# Patient Record
(demographics unavailable — no encounter records)

---

## 2024-11-12 NOTE — HEALTH RISK ASSESSMENT
[Good] : ~his/her~  mood as  good [Yes] : Yes [No] : In the past 12 months have you used drugs other than those required for medical reasons? No [0] : 2) Feeling down, depressed, or hopeless: Not at all (0) [Never] : Never [NO] : No [FreeTextEntry1] : health maintenance  [PHQ-2 Negative - No further assessment needed] : PHQ-2 Negative - No further assessment needed [de-identified] : ocasionally [[id number]] : 0 [BoneDensityComments] : n/a [ColonoscopyComments] : n/a

## 2024-11-12 NOTE — ASSESSMENT
[FreeTextEntry1] :  Annual Physical Exam: Bilateral Hand pain, Hearing loss, Nasal congestion  - BP is slightly elevated. Continue current management. - Check A1c, lipid panels, vitamin levels, urine analysis, TSH, PSA Total - ENT Referral, ORTHO Hand Surgery Referral suspect plantar fascitis, advised insole and exercises - Order Flu Panel, US Abdomen Complete   - RTO annually or as needed.   Pt verbalized understanding and will reach should any questions/concerns occur.

## 2024-11-12 NOTE — HISTORY OF PRESENT ILLNESS
[FreeTextEntry1] : Patient is present today to establish care and for a comprehensive Annual Physical Exam. [de-identified] : Patient betsey 52yr old male who is present today to establish care and for a comprehensive Annual Physical Exam.  Patient c/o congestion since yesterday, reports co-worker has had same issue for a few weeks now.  Reports body aches. Pt reports being hungry and having appetite. Reports breathing normal, woke up with slight CP this morning. Pt reports hands constantly require cracking and also reports pain at the bottom of left heel, hurts in the morning. Reports pain started ever since jogging without shoes once. Reports receiving a shot for trigger finger. Reports pain in fingers, feels release after cracking, discussed arthritis and also discussed hand specialist referral.   Pt reports that he stopped smoking weed for two months now. Reports neck pain/migraines some months ago, felt some relief after he stopped smoking and after massaging neck. Confirms exercising routinely. Pt reports really bad hangovers after drinking hard liquor, confirms drinking lots of water. Confirms no one in his family is also sick right now. Denies getting Shingles vaccine. Reports intermittent back pain is intermittent, reports nerve block injections made it slightly worse, but pack pain is generally better than before. Unaware if grinding teeth. Discussed routine annual ultrasound prescription for fatty liver. Discussed ENT referral. Reports trying to eat as healthy as possible. Reports stretching regularly. Discussed Plantar Fasciitis, discussed Aleve for a week, rolling heel over frozen water bottle (10 minutes) or cortisone shots.  Swab, oral consent received, normal.  Denies any CP, chest tightness or SOB. Denies any abdominal pain, urinary symptom, or change in bowel habits. Denies any fever, chills, or night sweats.

## 2024-11-12 NOTE — ADDENDUM
[FreeTextEntry1] : I, Carlo Parikh, acted as a scribe on behalf of Dr. Antoine Pisano MD, on 11/12/2024.   All medical entries made by the scribe were at my, Dr. Antoine Pisano MD, direction and personally dictated by me on 11/12/2024. I have reviewed the chart and agree that the record accurately reflects my personal performance of the history, physical exam, assessment and plan. I have also personally directed, reviewed, and agreed with the chart.

## 2024-11-12 NOTE — REVIEW OF SYSTEMS
[Negative] : Heme/Lymph [Hearing Loss] : hearing loss [Back Pain] : back pain [FreeTextEntry4] : nasal congestion [FreeTextEntry9] : bilateral hand pain, slight body aches

## 2025-02-21 NOTE — HISTORY OF PRESENT ILLNESS
[No Pertinent Cardiac History] : no history of aortic stenosis, atrial fibrillation, coronary artery disease, recent myocardial infarction, or implantable device/pacemaker [No Pertinent Pulmonary History] : no history of asthma, COPD, sleep apnea, or smoking [No Adverse Anesthesia Reaction] : no adverse anesthesia reaction in self or family member [(Patient denies any chest pain, claudication, dyspnea on exertion, orthopnea, palpitations or syncope)] : Patient denies any chest pain, claudication, dyspnea on exertion, orthopnea, palpitations or syncope [Good (7-10 METs)] : Good (7-10 METs) [Chronic Anticoagulation] : no chronic anticoagulation [Chronic Kidney Disease] : no chronic kidney disease [Diabetes] : no diabetes [FreeTextEntry1] : Upper/Lower Bleph with lower facelift [FreeTextEntry4] : Patient is a 52yr old male who is present today for medical clearance.   Pt confirms feeling well overall. Denies taking any new medications. Pt reports flu from previous visit lasted for two months but has improved. Confirms throat has been better for the last two days. Confirms hearing is normal. Denies dizziness, confirms slight neck pain. Denies grinding teeth. Pt c/o occasional shaking at times, confirms strength is stable. Confirms occasional excessive caffeine intake. Denies headaches or dizziness. Confirms sense of balance and coordination is stable. Pt confirms experiencing slight anxiety/overthinking but overall manageable. Denies Hx of sleep apnea. Pt reports no known allergies to anesthesia, Hx of complications, or Hx of difficult to control bleeding. Pt made aware to stop NSAIDs and omega-3s one week prior to procedure. Pt reports able to walk up two flights of steps without SOB. [FreeTextEntry2] : 02/25/25

## 2025-02-21 NOTE — ASSESSMENT
[Patient Optimized for Surgery] : Patient optimized for surgery [FreeTextEntry4] : Medical clearance visit: - BP is stable. Continue current management. - Check CBC, CMP, APTT, Prothrombin Time and INR  - RTO in 3 months  addendum: Patient is cleared to undergo anesthesia   Pt verbalized understanding and will reach should any questions/concerns occur.

## 2025-02-21 NOTE — HISTORY OF PRESENT ILLNESS
[No Pertinent Cardiac History] : no history of aortic stenosis, atrial fibrillation, coronary artery disease, recent myocardial infarction, or implantable device/pacemaker [No Pertinent Pulmonary History] : no history of asthma, COPD, sleep apnea, or smoking [No Adverse Anesthesia Reaction] : no adverse anesthesia reaction in self or family member [(Patient denies any chest pain, claudication, dyspnea on exertion, orthopnea, palpitations or syncope)] : Patient denies any chest pain, claudication, dyspnea on exertion, orthopnea, palpitations or syncope [Good (7-10 METs)] : Good (7-10 METs) [Chronic Anticoagulation] : no chronic anticoagulation [Chronic Kidney Disease] : no chronic kidney disease [Diabetes] : no diabetes [FreeTextEntry1] : Upper/Lower Bleph with lower facelift [FreeTextEntry2] : 02/25/25 [FreeTextEntry4] : Patient is a 52yr old male who is present today for medical clearance.   Pt confirms feeling well overall. Denies taking any new medications. Pt reports flu from previous visit lasted for two months but has improved. Confirms throat has been better for the last two days. Confirms hearing is normal. Denies dizziness, confirms slight neck pain. Denies grinding teeth. Pt c/o occasional shaking at times, confirms strength is stable. Confirms occasional excessive caffeine intake. Denies headaches or dizziness. Confirms sense of balance and coordination is stable. Pt confirms experiencing slight anxiety/overthinking but overall manageable. Denies Hx of sleep apnea. Pt reports no known allergies to anesthesia, Hx of complications, or Hx of difficult to control bleeding. Pt made aware to stop NSAIDs and omega-3s one week prior to procedure. Pt reports able to walk up two flights of steps without SOB.

## 2025-02-21 NOTE — ADDENDUM
[FreeTextEntry1] : I, Carlo Parikh, acted as a scribe on behalf of Dr. Antoine Pisano MD, on 02/13/2025.   All medical entries made by the scribe were at my, Dr. Antoine Pisano MD, direction and personally dictated by me on 02/13/2025. I have reviewed the chart and agree that the record accurately reflects my personal performance of the history, physical exam, assessment and plan. I have also personally directed, reviewed, and agreed with the chart.